# Patient Record
Sex: FEMALE | Race: BLACK OR AFRICAN AMERICAN
[De-identification: names, ages, dates, MRNs, and addresses within clinical notes are randomized per-mention and may not be internally consistent; named-entity substitution may affect disease eponyms.]

---

## 2019-04-27 ENCOUNTER — HOSPITAL ENCOUNTER (EMERGENCY)
Dept: HOSPITAL 88 - FSED | Age: 29
Discharge: HOME | End: 2019-04-27
Payer: COMMERCIAL

## 2019-04-27 VITALS — WEIGHT: 190 LBS | HEIGHT: 60 IN | BODY MASS INDEX: 37.3 KG/M2

## 2019-04-27 DIAGNOSIS — G40.909: ICD-10-CM

## 2019-04-27 DIAGNOSIS — F32.9: ICD-10-CM

## 2019-04-27 DIAGNOSIS — F41.9: ICD-10-CM

## 2019-04-27 DIAGNOSIS — E66.01: ICD-10-CM

## 2019-04-27 DIAGNOSIS — R07.89: Primary | ICD-10-CM

## 2019-04-27 PROCEDURE — 71046 X-RAY EXAM CHEST 2 VIEWS: CPT

## 2019-04-27 PROCEDURE — 85025 COMPLETE CBC W/AUTO DIFF WBC: CPT

## 2019-04-27 PROCEDURE — 81003 URINALYSIS AUTO W/O SCOPE: CPT

## 2019-04-27 PROCEDURE — 99284 EMERGENCY DEPT VISIT MOD MDM: CPT

## 2019-04-27 PROCEDURE — 93005 ELECTROCARDIOGRAM TRACING: CPT

## 2019-04-27 PROCEDURE — 85379 FIBRIN DEGRADATION QUANT: CPT

## 2019-04-27 PROCEDURE — 81025 URINE PREGNANCY TEST: CPT

## 2019-04-27 PROCEDURE — 80053 COMPREHEN METABOLIC PANEL: CPT

## 2019-04-27 PROCEDURE — 84484 ASSAY OF TROPONIN QUANT: CPT
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
## 2019-04-27 NOTE — DIAGNOSTIC IMAGING REPORT
EXAMINATION:  CXR 2 VIEW - HOPD    



INDICATION:             



COMPARISON:  None

     

FINDINGS:  PA and lateral views



TUBES and LINES:  None.



LUNGS:  Lungs are well inflated.  Lungs are clear.   There is no evidence of

pneumonia or pulmonary edema.



PLEURA:  No pleural effusion or pneumothorax.



HEART AND MEDIASTINUM:  The cardiomediastinal silhouette is unremarkable.



BONES AND SOFT TISSUES:  No acute osseous lesion.  Soft tissues are

unremarkable.



UPPER ABDOMEN: No free air under the diaphragm. 



IMPRESSION: 

No acute thoracic abnormality.





Signed by: Dr. Laura Cali M.D. on 4/27/2019 11:34 AM

## 2019-04-27 NOTE — XMS REPORT
Patient Summary Document

                             Created on: 2019



JOSEFA GAFFNEY

External Reference #: [id number]

: 1990

Sex: Female



Demographics







                          Address                   [address] 

[place]  [address]

 

                          Home Phone                [phone]

 

                          Preferred Language        Unknown

 

                          Marital Status            Unknown

 

                          Zoroastrianism Affiliation     Unknown

 

                          Race                      Unknown

 

                          Ethnic Group              Unknown





Author







                          [facility]                    [facility]

 

                          [facility]

 

                          Address                   Unknown

 

                          Phone                     Unavailable







Support







                Name            Relationship    Address         Phone

 

                    TRINI FIGUEREDO    PRS                 [id number]

[address]

[place], [place]  [address] [phone]

 

                    TRINI FIGUEREDO    PRS                 [address]

[address]

[place], [place]  [address] [phone]







Care Team Providers







                    Care Team Member Name    Role                Phone

 

                    LUIZ ASHRAF    Unavailable         Unavailable







Payers







             Payer Name    Policy Type    Policy Number    Effective Date    Expiration Date







Problems

This patient has no known problems.



Allergies, Adverse Reactions, Alerts







          Allergy Name    Allergy Type    Status    Severity    Reaction(s)    Onset Date    Inactive 

Date                      Treating Clinician        Comments

 

        No Known Allergies    DA      Active    U               2018 00:00:00                     







Medications

This patient has no known medications.



Results







           Test Description    Test Time    Test Comments    Text Results    Atomic Results    Result

 Comments

 

                WET PREP        2017 03:08:00                      

 

   

 

                WBC WET PREP (BEAKER) (test code=528)    Many white blood cells seen                     

 

                CLUE CELLS (BEAKER) (test code=526)    No clue cells seen                     

 

                YEAST WET PREP (BEAKER) (test code=530)    No budding yeast seen                     

 

                TRICH WET PREP (BEAKER) (test code=531)    No Trichomonas seen                     

 

                BACT WET PREP (BEAKER) (test code=532)    Moderate bacteria seen                     





RAPID STREP A [id number] 01:12:00* 





                Test Item       Value           Reference Range    Comments

 

                STREP A ANTIGEN (BEAKER) (test code=556)    Positive        Negative
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
## 2019-04-27 NOTE — XMS REPORT
Summary of Care

                             Created on: 2019



KATHY GAFFNEY CONNOR

External Reference #: [id number]

: 1990

Sex: Female



Demographics







                          Address                   [address] [place] [address]  [id number]

 

                          Preferred Language        English

 

                          Marital Status            Unknown

 

                          Latter day Affiliation     Unknown

 

                          Race                      Other Race

 

                          Ethnic Group              Non-





Author







                          Author                    SUSIE BARRETT N.P.

 

                          Organization              Unknown

 

                          Address                   Unknown

 

                          Phone                     Unavailable







Care Team Providers







                    Care Team Member Name    Role                Phone

 

                    SUSIE BARRETT N.P.    Unavailable         Unavailable

 

                    JOAQUIN MARTINEZ M.D.    Unavailable         Unavailable

 

                    JOAQUIN MARTINEZ MD    Unavailable         Unavailable

 

                    SUSIE SOTO     Unavailable         Unavailable

 

                          Unavailable               Unavailable







Functional Status







                    Name                Dates               Details

 

                                        Functional status health issues are not documented

                                                    Status: 









                    Name                Dates               Details

 

                                        Cognitive status health issues are not documented

                                                    Status: 







Problems







                    Name                Dates               Details

 

                                        Tinea capitis (110.0, B35.0) 

                                                    Status: Active

 

                                        Hirsutism (704.1, L68.0) 

                                                    Status: Active

 

                                        Screen for STD (sexually transmitted disease) (V74.5, Z11.3) 

                                                    Status: Active

 

                                        Overweight and obesity (278.02, E66.3) 

                                                    Status: Active

 

                                        Seizure (780.39, R56.9) 

                                                    Status: Active

 

                                        Paranoia (297.1, F22) 

                                                    Status: Active

 

                                        Major depressive disorder, recurrent episode with anxious distress (296.30, F33.9)

 

                                                    Status: Active







Medications







                    Name                Dates               Details

 

                                        buPROPion HCl ER (XL) 150 MG Oral Tablet Extended Release 24 Hour



 









Active

risperiDONE 0.5 MG Oral Tablet

TAKE 1 TABLET BY MOUTH AT BEDTIME

* 





                           Quantity: 30              Refills: 1









JOAQUIN MARTINEZ M.D. * 





                                         Start : 2019





Active

traZODone HCl - 50 MG Oral Tablet

TAKE 1 TABLET BY MOUTH AT BEDTIME

* 





                           Quantity: 30              Refills: 1









JOAQUIN MARTINEZ M.D. * 





                                         Start : 2019





Active

Ketoconazole 2 % External Shampoo

APPLY TO AFFECTED AREA(S) ONCE DAILY AS DIRECTED.

* 





                           Quantity: 2               Refills: 1









SUSIE BARRETT N.P. * 





                                         Start : 2019





Active

120 ML Bottle





Allergies and Adverse Reactions







                    Name                Dates               Details

 

                    No Known Drug Allergies (Allergy)                        Status: Active









Past Medical History







                    Name                Dates               Details

 

                                        History of anxiety (V11.8, Z86.59) 

                                                    Status: Resolved

 

                                        History of depression (V11.8, Z86.59) 

                                                    Status: Resolved

 

                                        History of diabetes mellitus (V12.29, Z86.39) 

                                                    Status: Resolved

 

                                        History of seizure (V12.49, Z87.898) 

                                                    Status: Resolved







Procedures







                    Procedure           Dates               Details

 

                    [QLH] CBC (INCLUDES DIFF/PLT)    Date: 2019     

 

                    [QLH] CMP W/EGFR    Date: 2019     

 

                    [QH] LIPID PANEL WITH REFLEX TO DIRECT LDL    Date: 2019     

 

                    [QLH] TSH, 3RD GENERATION W/REFLEX TO FT4    Date: 2019     

 

                    [QLH] HEMOGLOBIN A1c    Date: 2019     

 

                    [L] UA/M w/rflx Culture, Comp    Date: 2019     

 

                    [QH] HIV AB, HIV 1/2, EIA, WITH REFLEXES    Date: 2019     

 

                    [LH] GC/CT by Amp Det (APTIMA)    Date: 2019     

 

                    [QLH] RPR           Date: 2019     

 

                    [QLH] HEPATITIS C ANTIBODY    Date: 2019     

 

                    [QLH] HEPATITIS B SURFACE ANTIBODY (QUANT)    Date: 2019     

 

                    [QH] HEPATITIS B SURFACE ANTIGEN W/REFL CONFIRM    Date: 2019     

 

                    [QLH] TESTOSTERONE, TOTAL    Date: 2019     

 

                    [QLH] DHEA SULFATE    Date: 2019     

 

                    [QL] FSH AND LH     Date: 2019     

 

                    [QLH] ESTRADIOL     Date: 2019     

 

                    [QLH] PROGESTERONE    Date: 2019     

 

                    History of No history of surgery                        Completed 









Immunization







                    Name                Dates               Details

 

                                        Immunizations not documented

                                                     







Family History







                    Name                Dates               Details

 

                                        Family history of diabetes mellitus (V18.0, Z83.3) 

                                                    Status: Active

 

                                        Family history of malignant neoplasm of breast (V16.3, Z80.3) 

                                                    Status: Active









                    Name                Dates               Details

 

                                        Family history of essential hypertension (V17.49, Z82.49) 

                                                    Status: Active

 

                                        Family history of diabetes mellitus (V18.0, Z83.3) 

                                                    Status: Active

 

                                        Family history of malignant neoplasm of breast (V16.3, Z80.3) 

                                                    Status: Active







Social History







                    Name                Dates               Details

 

                    Unknown if ever smoked                         







Vital Signs







                Date            Test            Result          Details

 

                                                                 

 

                                        [id number]

                    BP Systolic         107 mm[Hg]          Status: Comments: Location: LUE; Position: Sitting



 

                    BP Diastolic        75 mm[Hg]           Status: Comments: Location: LUE; Position: Sitting



 

                    Height              60 in               Status: 



 

                    Weight              232.375 lb          Status: 



 

                    Body Mass Index Calculated    45.38 kg/m2         Status: 



 

                    Body Surface Area Calculated    1.99 m2             Status: 



 

                    Temperature         98.1 f              Status: Comments: Method: Oral



 

                    Heart Rate          99 /min             Status: Comments: Location: L Brachial Artery; Quality: Normal





 

                    Respiration Rate    18 /min             Status: Comments: Quality: Normal



 

                    O2 SAT              99 %                Status: Comments: Source: RA



 

                    Physical Findings    0                   Status: Comments: Alcohol Screen - How many times in the past

 yr have you had 5 (for M) or 4 (for F) or 4 (for all > 65yrs) or more drinks in
 a day?



 

                                                                 

 

                                        [id number]

                    BP Systolic         122 mm[Hg]          Status: Comments: Location: LUE; Position: Sitting



 

                    BP Diastolic        78 mm[Hg]           Status: Comments: Location: LUE; Position: Sitting



 

                    Height              60.63 in            Status: 



 

                    Weight              230.25 lb           Status: 



 

                    Body Mass Index Calculated    44.04 kg/m2         Status: 



 

                    Body Surface Area Calculated    2 m2                Status: 



 

                    Temperature         98.7 f              Status: Comments: Method: Oral



 

                    Heart Rate          91 /min             Status: Comments: Location: L Brachial Artery; Quality: Normal





 

                    Respiration Rate    18 /min             Status: Comments: Quality: Normal



 

                    O2 SAT              99 %                Status: Comments: Source: RA



 

                    Physical Findings    0                   Status: Comments: Alcohol Screen - How many times in the past

 yr have you had 5 (for M) or 4 (for F) or 4 (for all > 65yrs) or more drinks in
 a day?



 

                    Physical Findings    26                  Status: Comments: PHQ-9 Adult Depression Screening









Results







                Date            Description     Value           Details

 

                    Results not documented                         

 

                                                                 







Plan of Care







                    Name                Dates               Details

 

                                        Planned Observations 

 

                    Planned Goals not documented                         

 

                                        Planned Encounters 

 

                                        Neurology Referral

                                         

 

                                        Appointment; SUSIE BARRETT NP

                                        On: 7-May-2019 9:00



 

                                        Appointment; JOAQUIN MARTINEZ M.D.

                                        On: 10-May-2019 12:30



 

                                        Appointment; SHANA SHARPE LCSW

                                        On: 16-May-2019 8:00









Interventions Provided

Medication Changes* Ketoconazole 2 % External Shampoo - Start

Labs/Procedures/Imaging* [L] UA/M w/rflx Culture, Comp; To Be Done: 2019

* [LH] GC/CT by Amp Det (APTIMA); To Be Done: 2019

* [QH] HEPATITIS B SURFACE ANTIGEN W/REFL CONFIRM; To Be Done: 2019

* [QH] HIV AB, HIV 1/2, EIA, WITH REFLEXES; To Be Done: 2019

* [QH] LIPID PANEL WITH REFLEX TO DIRECT LDL; To Be Done: 2019

* [QL] FSH AND LH; To Be Done: 2019

* [QLH] CBC (INCLUDES DIFF/PLT); To Be Done: 2019

* [QLH] CMP W/EGFR; To Be Done: 2019

* [QLH] DHEA SULFATE; To Be Done: 2019

* [QLH] ESTRADIOL; To Be Done: 2019

* [QLH] HEMOGLOBIN A1c; To Be Done: 2019

* [QLH] HEPATITIS B SURFACE ANTIBODY (QUANT); To Be Done: 2019

* [QLH] HEPATITIS C ANTIBODY; To Be Done: 2019

* [QLH] PROGESTERONE; To Be Done: 2019

* [QLH] RPR; To Be Done: 2019

* [QLH] TESTOSTERONE, TOTAL; To Be Done: 2019

* [QLH] TSH, 3RD GENERATION W/REFLEX TO FT4; To Be Done: 2019

Plan* 1. well woman exam/ STD screening; 

* - ordered routine labs; 

* - no pap smear due to last pap was 2 month ago; 

* 2. Hirsutism:  

* - ordered hormonal panel; 

* 3. seizure; 

* - referred to neurologist; 

* 4. tinea capitis; 

* - start patient on Ketoconazole shampoo; 

* Mis; f/u with provider in 2 weeks for lab review and hirsutism treatment;





Instructions







                    Name                Dates               Details

 

                                        Instructions not documented

                                                     







Encounters







                                        Appointment; JOAQUIN MARTINEZ M.D. 

Encounter Diagnosis: Problem not documented

                                        On: 2019 15:00



 

                                        Appointment; SUSIE BARRETT NP 

Encounter Diagnosis: Problem not documented

                                        On: 2019 10:20

## 2019-04-27 NOTE — DIAGNOSTIC IMAGING REPORT
LEFT WRIST X-RAY - 3 VIEWS    



HISTORY:  Left wrist pain for 3 days         

COMPARISON: None available.

     

FINDINGS:

Bones:

No acute displaced fracture.  

Osseous alignment is within normal limits.



Joints:

The joint spaces are well-maintained.



Soft tissues:

The soft tissues appear unremarkable.





IMPRESSION: 

No acute radiographic abnormality.



Signed by: Dr. Laura Cali M.D. on 4/27/2019 1:28 PM

## 2019-04-27 NOTE — XMS REPORT
Continuity of Care Document

                             Created on: 2015



KATHY GAFFNEY

External Reference #: [id number]

: 1990

Sex: Female



Demographics







                          Address                   [address]

[place]  [address]

 

                          Home Phone                [phone] cell

 

                          Preferred Language        Unknown

 

                          Marital Status            Never 

 

                          Jehovah's witness Affiliation     Unknown

 

                          Race                      Unknown

 

                          Ethnic Group              Unknown





Author







                          Author                    [facility]

 

                          Organization              [facility]

 

                          Address                   Unknown

 

                          Phone                     Unavailable







Care Team Providers







                    Care Team Member Name    Role                Phone

 

                    MD Migdalia, Maria Elena EVANS                  Unavailable



                                                                                
                             



Insurance Providers

              





             Payer name    Policy type / Coverage type    Policy ID    Covered party ID    Policy [employer]



 

             [employer] - ST[employer] (MEDIC                                            

 

             [facility]                                            



                                                                                
                     



Encounters

          





                Encounter       Performer       Location        Date

 

                Office Visit    Maria Elena Negron MD    [facility]    May 

13, 2015



                                                                                
               



Problems

          





                    Problem             Effective Dates     Problem Status

 

                    BACK PAIN, LOW      May 13, 2015        Active

 

                    RASH NOS            May 13, 2015        Active



                                                                                
                         



Procedures

          





                    Date                Description         Comments

 

                    May 13, 2015        smoking status      Never smoker



                                                                                
               



Medications

          





                Medication      Instructions    Start Date      Status

 

                    CYCLOBENZAPRINE HCL 10 MG TABS    1 tablet at night as needed for spasms    May 13, 

2015                                    Active

 

                MELOXICAM 15 MG TABS    1 tablet daily as needed for pain    May 13, 2015    Active

 

                    TRAMADOL HCL 50 MG TABS    1 tablet every 8 hours as needed for pain    May 13, 2015

                                        Active

 

                    LOTRISONE 1-0.05 % CREA    apply to affected area twice daily as needed    May 13, 2015

                                        Active



                                                                                
                         



Vital Signs

          





                Date            Description     Test            Result

 

                May 13, 2015    height E&M - 8302-2    HEIGHT          61 in

 

                May 13, 2015    weight E&M - 3141-9    WEIGHT          227 lb

 

                May 13, 2015    temperature E&M    TEMPERATURE     98.3 deg f

 

                May 13, 2015    pulse rate E&M - 8867-4    PULSE RATE      84 /min

 

                May 13, 2015    blood pressure, systolic - 8480-6    BP SYSTOLIC     127 mm Hg

 

                May 13, 2015    blood pressure, diastolic - 8462-4    BP DIASTOLIC    86 mm Hg

## 2019-04-27 NOTE — XMS REPORT
Clinical Summary

                             Created on: 2019



Nehal Guamanargelia RYANJuani

External Reference #: [id number]

: 1990

Sex: Female



Demographics







                          Address                   [address]

[place]  [address]

 

                          Home Phone                [phone]

 

                          Preferred Language        English

 

                          Marital Status            Unknown

 

                          Roman Catholic Affiliation     [employer]

 

                          Race                      Black or 

 

                          Ethnic Group              Non-





Author







                          Author                    [facility]

 

                          Address                   Unknown

 

                          Phone                     Unavailable







Support







                Name            Relationship    Address         Phone

 

                    Opal Ferrer      [employer]                [address]

[place]  [address]                      [phone]







Care Team Providers







                    Care Team Member Name    Role                Phone

 

                    Afshin Romoe Rivera    PCP                 [phone]







Allergies

No Known Allergies



Medications







                          End Date                  Status



              Medication     Sig          Dispensed     Refills      Start  



                                         Date  

 

                                                    Active



                     ibuprofen (ADVIL,MOTRIN)     Take 800 mg         0   



                           800 MG tablet             by mouth as     



                                         needed for     



                                         Pain.     

 

                                                    Active



                     acetaminophen-codeine     Take 1 tablet       0   



                           (TYLENOL #3) 300-30 mg     by mouth     



                           per tablet                every 4     



                                         (four) hours     



                                         as needed for     



                                         Pain.     

 

                                                    Active



                     amoxicillin (AMOXIL) 250     Take 250 mg         0   



                           MG capsule                by mouth 3     



                                         (three) times     



                                         daily.     







Active Problems





Not on file



Social History







                                        Date



                 Tobacco Use     Types           Packs/Day       Years Used 

 

                                         



                                         Current Some Day Smoker    

 

    



                                         Smokeless Tobacco: Never   



                                         Used   









   



                 Alcohol Use     Drinks/Week     oz/Week         Comments

 

   



                                         Yes   









 



                           Sex Assigned at Birth     Date Recorded

 

 



                                         Not on file 









                                        Industry



                           Job Start Date            Occupation 

 

                                        Not on file



                           Not on file               Not on file 









                                        Travel End



                           Travel History            Travel Start 

 





                                         No recent travel history available.







Last Filed Vital Signs

Not on file



Plan of Treatment





Not on file



Results

Not on fileafter 2018



Insurance







     



            Payer      Benefit     Subscriber ID     Type       Phone      Address



                                         Plan /    



                                         Group    

 

     



                 MEDICAID - MEDICAID [id number]     MEDICAID        [id number]       Medicaid  



                     CARE                [employer]          Non-Contra  



                                         cted  









     



            Guarantor Name     Account     Relation to     Date of     Phone      Billing Address



                     Type                Patient             Birth  

 

     



            Gregoria Guaman     Personal/F     Self       1990     [phone]     [address] [place] [address]               (Home)              [place] [address]

## 2023-07-19 ENCOUNTER — PATIENT MESSAGE (OUTPATIENT)
Dept: RESEARCH | Facility: HOSPITAL | Age: 33
End: 2023-07-19

## 2025-05-14 ENCOUNTER — ON-DEMAND VIRTUAL (OUTPATIENT)
Dept: URGENT CARE | Facility: CLINIC | Age: 35
End: 2025-05-14

## 2025-05-14 DIAGNOSIS — M25.561 ACUTE PAIN OF RIGHT KNEE: Primary | ICD-10-CM

## 2025-05-14 RX ORDER — IBUPROFEN 800 MG/1
800 TABLET, FILM COATED ORAL 3 TIMES DAILY
Qty: 30 TABLET | Refills: 0 | Status: SHIPPED | OUTPATIENT
Start: 2025-05-14 | End: 2025-05-24

## 2025-05-14 NOTE — PROGRESS NOTES
Subjective:      Patient ID: Quinten Ng is a 34 y.o. female.    Vitals:  vitals were not taken for this visit.     Chief Complaint: Knee Pain      Visit Type: TELE AUDIOVISUAL    Patient Location: Home     Present with the patient at the time of consultation: TELEMED PRESENT WITH PATIENT: None    History reviewed. No pertinent past medical history.  History reviewed. No pertinent surgical history.  Review of patient's allergies indicates:  No Known Allergies  Medications Ordered Prior to Encounter[1]  No family history on file.    Medications Ordered                [facility] [address] [place] [address] [id number] [facility]   [address] [facility] [address]    Telephone: [phone]   Fax: [phone]   Hours: Not open 24 hours                         E-Prescribed (1 of 1)              ibuprofen (ADVIL,MOTRIN) 800 MG tablet    Sig: Take 1 tablet (800 mg total) by mouth 3 (three) times daily. for 10 days       Start: 5/14/25     Quantity: 30 tablet Refills: 0                           [facility] Peq Odvv Intake    5/14/2025  7:23 AM CDT - Filed by Patient   What is your current physical address in the event of a medical emergency? [address]   Are you able to take your vital signs? Yes   Systolic Blood Pressure: 174   Diastolic Blood Pressure: 109   Weight: 232   Height:    Pulse: 92   Temperature:    Respiration rate:    Pulse Oxygen:    Please attach any relevant images or files    Is your employer contracted with [facility]? No         Patient has right knee pain. It was hurting a little and then she got on a ladder at [employer] while working and it got worse. It is now swollen and she cannot bend it. Her blood pressure is also high which is not typical for her. She has no sxs of her high BP    Eyes: Negative.    Musculoskeletal:  Positive for joint pain and joint swelling.   Neurological: Negative.         Objective:   The physical exam was conducted virtually.  Physical Exam    Constitutional:      Comments:Right knee swelling     Abdominal: Normal appearance.   Neurological: She is alert.       Assessment:     1. Acute pain of right knee        Plan:       Acute pain of right knee    Other orders  -     ibuprofen (ADVIL,MOTRIN) 800 MG tablet; Take 1 tablet (800 mg total) by mouth 3 (three) times daily. for 10 days  Dispense: 30 tablet; Refill: 0      Patient to go to ER or urgent care to get exam and check BP.                     [1]  Current Outpatient Medications on File Prior to Visit   Medication Sig Dispense Refill    amitriptyline (ELAVIL) 10 MG tablet Take 1 tablet (10 mg total) by mouth nightly as needed for Pain. 30 tablet 5    ketoconazole (NIZORAL) 2 % shampoo Apply topically twice a week. 120 mL 11    levETIRAcetam (KEPPRA) 250 MG Tab Take 1 tablet (250 mg total) by mouth 2 (two) times daily. 180 tablet 3    sumatriptan (IMITREX) 50 MG tablet Take 1 po at onset of HA May repeat in 2h if partial relief 9 tablet 11     No current facility-administered medications on file prior to visit.